# Patient Record
Sex: FEMALE | Race: BLACK OR AFRICAN AMERICAN | NOT HISPANIC OR LATINO | Employment: FULL TIME | URBAN - METROPOLITAN AREA
[De-identification: names, ages, dates, MRNs, and addresses within clinical notes are randomized per-mention and may not be internally consistent; named-entity substitution may affect disease eponyms.]

---

## 2017-12-05 ENCOUNTER — HISTORICAL (OUTPATIENT)
Dept: ADMINISTRATIVE | Facility: HOSPITAL | Age: 27
End: 2017-12-05

## 2017-12-07 LAB — FINAL CULTURE: NORMAL

## 2021-03-12 ENCOUNTER — HISTORICAL (OUTPATIENT)
Dept: ADMINISTRATIVE | Facility: HOSPITAL | Age: 31
End: 2021-03-12

## 2021-03-14 LAB — FINAL CULTURE: NORMAL

## 2023-06-11 DIAGNOSIS — Z36.2 ENCOUNTER FOR FOLLOW-UP ULTRASOUND OF FETAL ANATOMY: Primary | ICD-10-CM

## 2023-06-16 ENCOUNTER — OFFICE VISIT (OUTPATIENT)
Dept: MATERNAL FETAL MEDICINE | Facility: CLINIC | Age: 33
End: 2023-06-16
Payer: MEDICAID

## 2023-06-16 ENCOUNTER — PROCEDURE VISIT (OUTPATIENT)
Dept: MATERNAL FETAL MEDICINE | Facility: CLINIC | Age: 33
End: 2023-06-16
Payer: MEDICAID

## 2023-06-16 VITALS
HEART RATE: 84 BPM | DIASTOLIC BLOOD PRESSURE: 72 MMHG | HEIGHT: 67 IN | WEIGHT: 285 LBS | BODY MASS INDEX: 44.73 KG/M2 | SYSTOLIC BLOOD PRESSURE: 96 MMHG

## 2023-06-16 DIAGNOSIS — O99.343 DEPRESSION COMPLICATING PREGNANCY, ANTEPARTUM, THIRD TRIMESTER: ICD-10-CM

## 2023-06-16 DIAGNOSIS — F32.A DEPRESSION COMPLICATING PREGNANCY, ANTEPARTUM, THIRD TRIMESTER: ICD-10-CM

## 2023-06-16 DIAGNOSIS — O10.013 PRE-EXISTING ESSENTIAL HYPERTENSION COMPLICATING PREGNANCY IN THIRD TRIMESTER: ICD-10-CM

## 2023-06-16 DIAGNOSIS — O99.213 OBESITY COMPLICATING PREGNANCY IN THIRD TRIMESTER: ICD-10-CM

## 2023-06-16 DIAGNOSIS — J45.909 ASTHMA AFFECTING PREGNANCY IN THIRD TRIMESTER: ICD-10-CM

## 2023-06-16 DIAGNOSIS — O99.513 ASTHMA AFFECTING PREGNANCY IN THIRD TRIMESTER: ICD-10-CM

## 2023-06-16 DIAGNOSIS — Z36.2 ENCOUNTER FOR FOLLOW-UP ULTRASOUND OF FETAL ANATOMY: ICD-10-CM

## 2023-06-16 PROCEDURE — 76819 PR US, OB, FETAL BIOPHYSICAL, W/O NST: ICD-10-PCS | Mod: S$GLB,,, | Performed by: OBSTETRICS & GYNECOLOGY

## 2023-06-16 PROCEDURE — 3078F DIAST BP <80 MM HG: CPT | Mod: CPTII,S$GLB,, | Performed by: OBSTETRICS & GYNECOLOGY

## 2023-06-16 PROCEDURE — 1159F MED LIST DOCD IN RCRD: CPT | Mod: CPTII,S$GLB,, | Performed by: OBSTETRICS & GYNECOLOGY

## 2023-06-16 PROCEDURE — 76816 OB US FOLLOW-UP PER FETUS: CPT | Mod: S$GLB,,, | Performed by: OBSTETRICS & GYNECOLOGY

## 2023-06-16 PROCEDURE — 99213 PR OFFICE/OUTPT VISIT, EST, LEVL III, 20-29 MIN: ICD-10-PCS | Mod: TH,25,S$GLB, | Performed by: OBSTETRICS & GYNECOLOGY

## 2023-06-16 PROCEDURE — 76819 FETAL BIOPHYS PROFIL W/O NST: CPT | Mod: S$GLB,,, | Performed by: OBSTETRICS & GYNECOLOGY

## 2023-06-16 PROCEDURE — 3078F PR MOST RECENT DIASTOLIC BLOOD PRESSURE < 80 MM HG: ICD-10-PCS | Mod: CPTII,S$GLB,, | Performed by: OBSTETRICS & GYNECOLOGY

## 2023-06-16 PROCEDURE — 1159F PR MEDICATION LIST DOCUMENTED IN MEDICAL RECORD: ICD-10-PCS | Mod: CPTII,S$GLB,, | Performed by: OBSTETRICS & GYNECOLOGY

## 2023-06-16 PROCEDURE — 3074F SYST BP LT 130 MM HG: CPT | Mod: CPTII,S$GLB,, | Performed by: OBSTETRICS & GYNECOLOGY

## 2023-06-16 PROCEDURE — 76816 PR  US,PREGNANT UTERUS,F/U,TRANSABD APP: ICD-10-PCS | Mod: S$GLB,,, | Performed by: OBSTETRICS & GYNECOLOGY

## 2023-06-16 PROCEDURE — 3008F PR BODY MASS INDEX (BMI) DOCUMENTED: ICD-10-PCS | Mod: CPTII,S$GLB,, | Performed by: OBSTETRICS & GYNECOLOGY

## 2023-06-16 PROCEDURE — 3074F PR MOST RECENT SYSTOLIC BLOOD PRESSURE < 130 MM HG: ICD-10-PCS | Mod: CPTII,S$GLB,, | Performed by: OBSTETRICS & GYNECOLOGY

## 2023-06-16 PROCEDURE — 3008F BODY MASS INDEX DOCD: CPT | Mod: CPTII,S$GLB,, | Performed by: OBSTETRICS & GYNECOLOGY

## 2023-06-16 PROCEDURE — 99213 OFFICE O/P EST LOW 20 MIN: CPT | Mod: TH,25,S$GLB, | Performed by: OBSTETRICS & GYNECOLOGY

## 2023-06-16 RX ORDER — FLUTICASONE PROPIONATE 50 MCG
2 SPRAY, SUSPENSION (ML) NASAL
COMMUNITY
Start: 2023-05-25

## 2023-06-16 RX ORDER — BUDESONIDE 90 UG/1
1 AEROSOL, POWDER RESPIRATORY (INHALATION) 2 TIMES DAILY
COMMUNITY
Start: 2023-02-16

## 2023-06-16 RX ORDER — FERROUS SULFATE 325(65) MG
TABLET, DELAYED RELEASE (ENTERIC COATED) ORAL
COMMUNITY

## 2023-06-16 RX ORDER — BUTALBITAL, ACETAMINOPHEN AND CAFFEINE 300; 40; 50 MG/1; MG/1; MG/1
1-2 CAPSULE ORAL EVERY 6 HOURS PRN
COMMUNITY
Start: 2023-05-12

## 2023-06-16 RX ORDER — NAPROXEN SODIUM 220 MG/1
81 TABLET, FILM COATED ORAL 2 TIMES DAILY
COMMUNITY

## 2023-06-16 RX ORDER — CETIRIZINE HYDROCHLORIDE 10 MG/1
10 TABLET ORAL
COMMUNITY
Start: 2023-05-25

## 2023-06-16 NOTE — ASSESSMENT & PLAN NOTE
With no significant symptoms, It was agreed to stay off the medication at this time with patient to report symptoms of depression recurrence. If she experiences any SI/HI tendencies, she is to report it immediately to provider/ER for prompt intervention. She was advised to continue follow up care with her Mental Health provider.

## 2023-06-16 NOTE — ASSESSMENT & PLAN NOTE
Chronic hypertension   With normal fetal growth  (2501g at 38% on 2023), Normal SHANNON, BPP /.    Chronic hypertension complicates up to 2-5% of pregnancies and is associated with significant adverse pregnancy outcomes including  birth, fetal growth restriction, fetal demise, placental abruption, and  delivery.    With blood pressure 96/72, she was advised to continue low sodium diet avoiding any excessive weight gain.    Continue 81mg aspirin BID until 34 6/7 weeks, then adjust to daily dosing thereafter until delivery. Reviewed preeclampsia precautions.    With normal fetal growth and normal blood pressure with no medication needed, weekly testing could be done by Dr. Palafox till delivery at 39 weeks.

## 2023-06-16 NOTE — ASSESSMENT & PLAN NOTE
Weight stable. Advised to eat healthy, low Na diet avoiding any additional excessive weight gain. Excess weight gain would be associated with worsening hypertension, gestational diabetes and adverse  outcomes, including fetal demise in utero.    Preoperative antibiotics and thromboprophylaxis with use if pneumatic compression devices is recommended in those undergoing  delivery. Thromboprophylaxis should also be use with those on prolonged immobilization.    With higher risks for gestational diabetes with BMI greater than 30, she had normal early 3 hour GTT. She [place] [facility] [place]

## 2023-06-16 NOTE — PROGRESS NOTES
Maternal Fetal Medicine follow up consult    Subjective     Patient ID: Noelle Bruce Samson is a 32 y.o. female  at 34w0d gestation with Estimated Date of Delivery: 23  who is here for follow  up consultation by M.    Chief Complaint: MFM FOLLOW UP     She has chronic hypertension, currently on no antihypertensives. She is on prophylactic low dose aspirin BID.On 23, 24 hour urine protein 165mg. She has asthma, diagnosed as a child, currently on albuterol prn and pulmicort 90 mcg bid (started at consult visit). She reports she is not taking either as she has not had any issues with asthma recently. She has hx of depression, diagnosed as a teenager, currently not on medication and feeling well. She had elevated BMI over 45 at consult visit. She had normal early 3 hour GTT.She reports did repeat 3hr GTT at [facility] – [place]     Pregnancy complications include:   Patient Active Problem List   Diagnosis    Pre-existing essential hypertension complicating pregnancy in third trimester    Asthma affecting pregnancy in third trimester    Depression complicating pregnancy, antepartum, third trimester    Increased BMI complicating pregnancy in third trimester        No changes to medical, surgical, family, social, or obstetric history.    Interval history since last MFM visit: None.    HPI She denies any leaking fluid, vaginal bleeding, contractions, decreased fetal movement. Denies headaches, visual disturbances, or epigastric pain    Medications:  Current Outpatient Medications   Medication Instructions    aspirin 81 mg, Oral, 2 times daily    butalbital-acetaminophen-caff -40 mg Cap 1-2 capsules, Oral, Every 6 hours PRN    cetirizine (ZYRTEC) 10 mg, Oral    ferrous sulfate 325 (65 FE) MG EC tablet TAKE 1 TABLET BY MOUTH ONCE DAILY Oral for 30    fluticasone propionate (FLONASE) 50 mcg/actuation nasal spray 2 sprays, Each Nostril    PNV,calcium 72-iron-folic acid (PRENATAL VITAMIN PLUS LOW IRON) 27 mg iron-  1 mg Tab TAKE 1 TABLET BY MOUTH ONCE DAILY Oral for 30    PULMICORT FLEXHALER 90 mcg/actuation AePB 1 puff, 2 times daily       Review of Systems   Constitutional:  Negative for activity change.   Eyes:  Negative for visual disturbance.   Respiratory: Negative.     Cardiovascular:  Negative for leg swelling.   Gastrointestinal:  Negative for abdominal pain, constipation, diarrhea, nausea, vomiting and reflux.   Genitourinary:  Negative for bladder incontinence, frequency, pelvic pain, vaginal bleeding and vaginal discharge.        Good fetal movements   Musculoskeletal:  Negative for back pain.   Neurological:  Negative for headaches.   All other systems reviewed and are negative.        Objective     Physical Exam  Vitals and nursing note reviewed.   Constitutional:       Appearance: Normal appearance.      Comments: Increased BMI   HENT:      Head: Normocephalic and atraumatic.      Nose: Nose normal. No congestion.   Eyes:      Conjunctiva/sclera: Conjunctivae normal.   Cardiovascular:      Rate and Rhythm: Normal rate.   Pulmonary:      Effort: Pulmonary effort is normal.   Skin:     Findings: No bruising or rash.   Neurological:      Mental Status: She is alert and oriented to person, place, and time.   Psychiatric:         Mood and Affect: Mood normal.         Behavior: Behavior normal.         Thought Content: Thought content normal.         Judgment: Judgment normal.          Assessment and Plan     ASSESSMENT/PLAN:     32 y.o.  female with IUP at 34w0d    Pre-existing essential hypertension complicating pregnancy in third trimester  Chronic hypertension   With normal fetal growth  (2501g at 38% on 2023), Normal SHANNON, BPP 8/8.    Chronic hypertension complicates up to 2-5% of pregnancies and is associated with significant adverse pregnancy outcomes including  birth, fetal growth restriction, fetal demise, placental abruption, and  delivery.    With blood pressure 96/72, she was  advised to continue low sodium diet avoiding any excessive weight gain.    Continue 81mg aspirin BID until 34 6/7 weeks, then adjust to daily dosing thereafter until delivery. Reviewed preeclampsia precautions.    With normal fetal growth and normal blood pressure with no medication needed, weekly testing could be done by Dr. Palafox till delivery at 39 weeks.    Asthma affecting pregnancy in third trimester  Encouraged to use symbicort as directed. Reasonable to continue albuterol PRN. She was advised to keep a logbook of attacks and the need for inhaler treatments. Reviewed instructions to report the need for albuterol more than 3 times per week.    Depression complicating pregnancy, antepartum, third trimester  With no significant symptoms, It was agreed to stay off the medication at this time with patient to report symptoms of depression recurrence. If she experiences any SI/HI tendencies, she is to report it immediately to provider/ER for prompt intervention. She was advised to continue follow up care with her Mental Health provider.    Increased BMI complicating pregnancy in third trimester  Weight stable. Advised to eat healthy, low Na diet avoiding any additional excessive weight gain. Excess weight gain would be associated with worsening hypertension, gestational diabetes and adverse  outcomes, including fetal demise in utero.    Preoperative antibiotics and thromboprophylaxis with use if pneumatic compression devices is recommended in those undergoing  delivery. Thromboprophylaxis should also be use with those on prolonged immobilization.    With higher risks for gestational diabetes with BMI greater than 30, she had normal early 3 hour GTT. She reports did repeat 3hr GTT at [facility] [place]       Follow up None.           Components of this note were documented using voice recognition systems; and are subject to errors not corrected at proof reading.  Please contact the author for any  clarifications.

## 2023-06-16 NOTE — ASSESSMENT & PLAN NOTE
Encouraged to use symbicort as directed. Reasonable to continue albuterol PRN. She was advised to keep a logbook of attacks and the need for inhaler treatments. Reviewed instructions to report the need for albuterol more than 3 times per week.

## 2023-07-24 ENCOUNTER — PATIENT MESSAGE (OUTPATIENT)
Dept: RESEARCH | Facility: HOSPITAL | Age: 33
End: 2023-07-24
Payer: MEDICAID